# Patient Record
Sex: FEMALE | Race: WHITE | NOT HISPANIC OR LATINO | Employment: FULL TIME | URBAN - METROPOLITAN AREA
[De-identification: names, ages, dates, MRNs, and addresses within clinical notes are randomized per-mention and may not be internally consistent; named-entity substitution may affect disease eponyms.]

---

## 2024-06-02 ENCOUNTER — HOSPITAL ENCOUNTER (EMERGENCY)
Facility: HOSPITAL | Age: 24
Discharge: HOME | End: 2024-06-02
Attending: STUDENT IN AN ORGANIZED HEALTH CARE EDUCATION/TRAINING PROGRAM
Payer: MEDICARE

## 2024-06-02 ENCOUNTER — APPOINTMENT (EMERGENCY)
Dept: RADIOLOGY | Facility: HOSPITAL | Age: 24
End: 2024-06-02
Payer: MEDICARE

## 2024-06-02 ENCOUNTER — APPOINTMENT (EMERGENCY)
Dept: CT IMAGING | Facility: HOSPITAL | Age: 24
End: 2024-06-02
Payer: MEDICARE

## 2024-06-02 VITALS
HEART RATE: 87 BPM | SYSTOLIC BLOOD PRESSURE: 122 MMHG | OXYGEN SATURATION: 99 % | RESPIRATION RATE: 18 BRPM | TEMPERATURE: 98.9 F | DIASTOLIC BLOOD PRESSURE: 79 MMHG

## 2024-06-02 DIAGNOSIS — S09.90XA CLOSED HEAD INJURY, INITIAL ENCOUNTER: Primary | ICD-10-CM

## 2024-06-02 DIAGNOSIS — S01.01XA SCALP LACERATION, INITIAL ENCOUNTER: ICD-10-CM

## 2024-06-02 DIAGNOSIS — S63.502A LEFT WRIST SPRAIN: ICD-10-CM

## 2024-06-02 PROCEDURE — 90471 IMMUNIZATION ADMIN: CPT

## 2024-06-02 PROCEDURE — 73110 X-RAY EXAM OF WRIST: CPT

## 2024-06-02 PROCEDURE — 70450 CT HEAD/BRAIN W/O DYE: CPT

## 2024-06-02 PROCEDURE — 99284 EMERGENCY DEPT VISIT MOD MDM: CPT

## 2024-06-02 PROCEDURE — 90715 TDAP VACCINE 7 YRS/> IM: CPT | Performed by: PHYSICIAN ASSISTANT

## 2024-06-02 PROCEDURE — 12001 RPR S/N/AX/GEN/TRNK 2.5CM/<: CPT | Performed by: PHYSICIAN ASSISTANT

## 2024-06-02 PROCEDURE — 99285 EMERGENCY DEPT VISIT HI MDM: CPT | Performed by: PHYSICIAN ASSISTANT

## 2024-06-02 RX ORDER — ACETAMINOPHEN 325 MG/1
650 TABLET ORAL ONCE
Status: COMPLETED | OUTPATIENT
Start: 2024-06-02 | End: 2024-06-02

## 2024-06-02 RX ADMIN — TETANUS TOXOID, REDUCED DIPHTHERIA TOXOID AND ACELLULAR PERTUSSIS VACCINE, ADSORBED 0.5 ML: 5; 2.5; 8; 8; 2.5 SUSPENSION INTRAMUSCULAR at 15:26

## 2024-06-02 RX ADMIN — ACETAMINOPHEN 650 MG: 325 TABLET, FILM COATED ORAL at 16:29

## 2024-06-02 NOTE — DISCHARGE INSTRUCTIONS
3 staples were placed in your scalp laceration. Return to the Emergency Department if increased pain, fever, pus, redness, swelling, red streaks, vomiting, weakness, numbness, bleeding. Keep wound dry for 2 days. Sutures/staples out in 7 days.

## 2024-06-02 NOTE — ED PROVIDER NOTES
History  Chief Complaint   Patient presents with    Fall     Pt arrived via EMS. Pt is vacationing in our area and using a motorized skateboard-type scooter and lost control. Pt fell and injured her left wrist and has a head laceration on the right side of head. Pt was not wearing a helmet, no LOC, no thinners, is A/Ox4.     25 yo with head and left wrist injury after fall from scooter. States she put her foot down and fell onto right sided striking head, right buttock and left wrist. Has an abrasion and swelling to left buttock. Mild pain in this area. No bruising/ecchymosis. Also reports pain in left wrist over ulnar surface. Right scalp laceration, approximately 2.5cm length. No LOC. Mild pain around lac. No headache. No n/v. No neck pain. No other injuries reported. Not on thinners. Unknown last tetanus. No helmet.       History provided by:  Patient   used: No    Fall  Associated symptoms: headaches    Associated symptoms: no abdominal pain, no back pain, no chest pain, no seizures and no vomiting        None       Past Medical History:   Diagnosis Date    Asthma        Past Surgical History:   Procedure Laterality Date     SECTION      TONSILLECTOMY         History reviewed. No pertinent family history.  I have reviewed and agree with the history as documented.    E-Cigarette/Vaping    E-Cigarette Use Never User      E-Cigarette/Vaping Substances     Social History     Tobacco Use    Smoking status: Never    Smokeless tobacco: Never   Vaping Use    Vaping status: Never Used   Substance Use Topics    Alcohol use: Never    Drug use: Never       Review of Systems   Constitutional:  Negative for chills and fever.   HENT:  Negative for ear pain and sore throat.    Eyes:  Negative for pain and visual disturbance.   Respiratory:  Negative for cough and shortness of breath.    Cardiovascular:  Negative for chest pain and palpitations.   Gastrointestinal:  Negative for abdominal pain and  vomiting.   Genitourinary:  Negative for dysuria and hematuria.   Musculoskeletal:  Positive for joint swelling. Negative for arthralgias and back pain.   Skin:  Positive for wound. Negative for color change and rash.   Neurological:  Positive for headaches. Negative for seizures and syncope.   All other systems reviewed and are negative.      Physical Exam  Physical Exam  Vitals and nursing note reviewed.   Constitutional:       General: She is not in acute distress.     Appearance: She is well-developed.   HENT:      Head: Normocephalic and atraumatic.     Eyes:      Conjunctiva/sclera: Conjunctivae normal.   Cardiovascular:      Rate and Rhythm: Normal rate and regular rhythm.      Heart sounds: No murmur heard.  Pulmonary:      Effort: Pulmonary effort is normal. No respiratory distress.      Breath sounds: Normal breath sounds.   Abdominal:      Palpations: Abdomen is soft.      Tenderness: There is no abdominal tenderness.   Musculoskeletal:         General: No swelling.      Cervical back: Neck supple.   Skin:     General: Skin is warm and dry.      Capillary Refill: Capillary refill takes less than 2 seconds.   Neurological:      Mental Status: She is alert and oriented to person, place, and time.      GCS: GCS eye subscore is 4. GCS verbal subscore is 5. GCS motor subscore is 6.      Comments: GCS 15. AAOx3. Ambulating in department without difficulty. CN II-XII grossly intact. No focal neuro deficits.     Psychiatric:         Mood and Affect: Mood normal.         Vital Signs  ED Triage Vitals   Temperature Pulse Respirations Blood Pressure SpO2   06/02/24 1447 06/02/24 1447 06/02/24 1447 06/02/24 1447 06/02/24 1447   98.9 °F (37.2 °C) 83 18 119/61 100 %      Temp src Heart Rate Source Patient Position - Orthostatic VS BP Location FiO2 (%)   -- 06/02/24 1447 -- -- --    Monitor         Pain Score       06/02/24 1629       5           Vitals:    06/02/24 1447 06/02/24 1630   BP: 119/61 122/79   Pulse: 83 87          Visual Acuity  Visual Acuity      Flowsheet Row Most Recent Value   L Pupil Size (mm) 3   R Pupil Size (mm) 3            ED Medications  Medications   tetanus-diphtheria-acellular pertussis (BOOSTRIX) IM injection 0.5 mL (0.5 mL Intramuscular Given 6/2/24 1526)   acetaminophen (TYLENOL) tablet 650 mg (650 mg Oral Given 6/2/24 1629)       Diagnostic Studies  Results Reviewed       None                   CT head without contrast   Final Result by Vega Coles MD (06/02 1611)      No acute intracranial abnormality.      Right frontal scalp laceration without underlying calvarial fracture.            Workstation performed: [id number]         XR wrist 3+ views LEFT   ED Interpretation by Son Bautista PA-C (06/02 1555)   Negative.      Final Result by Tuan Purdy MD (06/02 1838)      No acute osseous abnormality.            Workstation performed: [id number]                    Procedures  Universal Protocol:  Consent: Verbal consent obtained.  Consent given by: patient  Patient understanding: patient states understanding of the procedure being performed  Patient identity confirmed: verbally with patient, arm band, provided demographic data and hospital-assigned identification number  Laceration repair    Date/Time: 6/2/2024 3:02 PM    Performed by: Son Bautista PA-C  Authorized by: Son Bautista PA-C  Anesthesia: local infiltration    Wound Dehiscence:  Superficial Wound Dehiscence: simple closure      Procedure Details:  Irrigation solution: saline  Irrigation method: syringe  Amount of cleaning: standard  Debridement: none  Degree of undermining: none  Skin closure: staples  Technique: simple  Approximation: close  Approximation difficulty: simple  Comments: Pt declined local anesthesia. 3 staples               ED Course  ED Course as of 06/02/24 2025   Sun Jun 02, 2024   1616 XR wrist 3+ views LEFT                                             Medical Decision Making  DDx including  but not limited to: laceration, deep structure involvement, foreign body, fracture, ICH, concussion, wound infection. Plan: CT head. XR left wrist. Wound closure with staples. Dispo pending.     MDM: 25 yo with head injury. CT negative for intracranial abnormalities. Wound close with 3 staples after irrigation. Discussed wound care and f/u. Return parameters provided. Pt understands and agrees with plan.      Amount and/or Complexity of Data Reviewed  Radiology: ordered and independent interpretation performed. Decision-making details documented in ED Course.    Risk  OTC drugs.  Prescription drug management.             Disposition  Final diagnoses:   Closed head injury, initial encounter   Left wrist sprain   Scalp laceration, initial encounter     Time reflects when diagnosis was documented in both MDM as applicable and the Disposition within this note       Time User Action Codes Description Comment    6/2/2024  3:45 PM Son Bautista Add [S09.90XA] Closed head injury, initial encounter     6/2/2024  3:45 PM Son Bautista Add [S63.502A] Left wrist sprain     6/2/2024  3:45 PM Son Bautista Add [S01.01XA] Scalp laceration, initial encounter           ED Disposition       ED Disposition   Discharge    Condition   Stable    Date/Time   Sun Jun 2, 2024  3:45 PM    Comment   Camryn Rodriguez discharge to home/self care.                   Follow-up Information    None         There are no discharge medications for this patient.      No discharge procedures on file.    PDMP Review       None            ED Provider  Electronically Signed by             Son Bautista PA-C  06/02/24 2025